# Patient Record
Sex: FEMALE | Race: OTHER | Employment: UNEMPLOYED | URBAN - METROPOLITAN AREA
[De-identification: names, ages, dates, MRNs, and addresses within clinical notes are randomized per-mention and may not be internally consistent; named-entity substitution may affect disease eponyms.]

---

## 2018-05-14 LAB
ABO/RH: NORMAL
ANTIBODY SCREEN: NORMAL
HEPATITIS C ANTIBODY INTERPRETATION: NORMAL

## 2018-05-15 LAB
BASOPHILS ABSOLUTE: 0 K/UL (ref 0–0.2)
BASOPHILS RELATIVE PERCENT: 0.4 %
EOSINOPHILS ABSOLUTE: 0.2 K/UL (ref 0–0.6)
EOSINOPHILS RELATIVE PERCENT: 2.6 %
HCT VFR BLD CALC: 39.2 % (ref 36–48)
HEMOGLOBIN: 13.7 G/DL (ref 12–16)
HEPATITIS B SURFACE ANTIGEN INTERPRETATION: NORMAL
HIV AG/AB: NORMAL
HIV ANTIGEN: NORMAL
HIV-1 ANTIBODY: NORMAL
HIV-2 AB: NORMAL
LYMPHOCYTES ABSOLUTE: 2.7 K/UL (ref 1–5.1)
LYMPHOCYTES RELATIVE PERCENT: 38.1 %
MCH RBC QN AUTO: 29.7 PG (ref 26–34)
MCHC RBC AUTO-ENTMCNC: 35.1 G/DL (ref 31–36)
MCV RBC AUTO: 84.7 FL (ref 80–100)
MONOCYTES ABSOLUTE: 0.4 K/UL (ref 0–1.3)
MONOCYTES RELATIVE PERCENT: 6.3 %
NEUTROPHILS ABSOLUTE: 3.7 K/UL (ref 1.7–7.7)
NEUTROPHILS RELATIVE PERCENT: 52.6 %
PDW BLD-RTO: 13.8 % (ref 12.4–15.4)
PLATELET # BLD: 217 K/UL (ref 135–450)
PMV BLD AUTO: 9 FL (ref 5–10.5)
RBC # BLD: 4.63 M/UL (ref 4–5.2)
RPR: NORMAL
RUBELLA ANTIBODY IGG: 182.2 IU/ML
WBC # BLD: 7 K/UL (ref 4–11)

## 2024-03-02 ENCOUNTER — OFFICE VISIT (OUTPATIENT)
Age: 29
End: 2024-03-02

## 2024-03-02 VITALS
BODY MASS INDEX: 34.87 KG/M2 | DIASTOLIC BLOOD PRESSURE: 70 MMHG | HEART RATE: 69 BPM | OXYGEN SATURATION: 96 % | TEMPERATURE: 98.2 F | SYSTOLIC BLOOD PRESSURE: 108 MMHG | WEIGHT: 200 LBS

## 2024-03-02 DIAGNOSIS — R30.0 DYSURIA: Primary | ICD-10-CM

## 2024-03-02 LAB
BILIRUBIN, POC: ABNORMAL
BLOOD URINE, POC: ABNORMAL
CLARITY, POC: CLEAR
COLOR, POC: ABNORMAL
GLUCOSE URINE, POC: ABNORMAL
KETONES, POC: ABNORMAL
LEUKOCYTE EST, POC: ABNORMAL
NITRITE, POC: ABNORMAL
PH, POC: 7
PROTEIN, POC: 300
SPECIFIC GRAVITY, POC: 1.03
UROBILINOGEN, POC: 8

## 2024-03-02 RX ORDER — NITROFURANTOIN 25; 75 MG/1; MG/1
100 CAPSULE ORAL 2 TIMES DAILY
Qty: 20 CAPSULE | Refills: 0 | Status: SHIPPED | OUTPATIENT
Start: 2024-03-02 | End: 2024-03-12

## 2024-03-02 RX ORDER — PHENAZOPYRIDINE HYDROCHLORIDE 200 MG/1
200 TABLET, FILM COATED ORAL 3 TIMES DAILY PRN
Qty: 6 TABLET | Refills: 0 | Status: SHIPPED | OUTPATIENT
Start: 2024-03-02 | End: 2024-03-04

## 2024-03-02 RX ORDER — SPIRONOLACTONE 25 MG/1
1 TABLET ORAL 2 TIMES DAILY
COMMUNITY
Start: 2022-11-30

## 2024-03-02 RX ORDER — BUPROPION HYDROCHLORIDE 100 MG/1
TABLET ORAL
COMMUNITY

## 2024-03-02 NOTE — PROGRESS NOTES
Arabella Dickson (:  1995) is a 28 y.o. female,New patient, here for evaluation of the following chief complaint(s):  Urinary Tract Infection (X 1 week/Blood, urgency, frequency, slight back pain, pelvic)      ASSESSMENT/PLAN:  Visit Diagnoses and Associated Orders       Dysuria    -  Primary       Macrobid and Pyridium RX    Culture, Urine [pending]      POCT Urinalysis no Micro [nitrates, leuks]           ORDERS WITHOUT AN ASSOCIATED DIAGNOSIS    spironolactone (ALDACTONE) 25 MG tablet [7437]      buPROPion (WELLBUTRIN) 100 MG tablet [9321]          Advised the patient that we will send urine specimen for further testing. Results are typically available in 2-3 days.  Patient stated ok to leave voice message with results.     Follow up with a PCP if do not improve. ER follow up required for symptoms including, but not limited to: abdominal pain, back/flank pain, nausea or vomiting, mental status change, fevers >101, dehydration, or rapid worsening symptoms Patient verbalized understanding.     SUBJECTIVE/OBJECTIVE:  C/o urgency, frequency, burning w/ urination x1 week. Denies any vaginal discomfort and discharge.        History provided by:  Patient  Urinary Tract Infection  This is a new problem. The current episode started in the past 7 days. The problem has been gradually worsening since onset. Associated symptoms include hematuria.     HPI:   28 y.o. female presents with symptoms of   Urinary Tract Infection  Patient complains of dysuria, frequency, urgency, abnormal smelling urine, burning with urination She has had symptoms for 1 week. Patient denies vaginal discharge. Patient does not have a history of recurrent UTI.  Patient does not have a history of pyelonephritis.          Vitals:    24 1115   BP: 108/70   Site: Right Upper Arm   Position: Sitting   Cuff Size: Medium Adult   Pulse: 69   Temp: 98.2 °F (36.8 °C)   TempSrc: Oral   SpO2: 96%   Weight: 90.7 kg (200 lb)       Results for POC orders

## 2024-03-05 ENCOUNTER — TELEPHONE (OUTPATIENT)
Age: 29
End: 2024-03-05

## 2024-03-05 LAB
BACTERIA UR CULT: ABNORMAL
BACTERIA UR CULT: ABNORMAL
ORGANISM: ABNORMAL

## 2024-03-06 NOTE — TELEPHONE ENCOUNTER
Spoke w/ pt via phone and made aware of change in abx d/t +bacteria and sensitivity. Pt v/u and denied further questions.